# Patient Record
Sex: FEMALE
[De-identification: names, ages, dates, MRNs, and addresses within clinical notes are randomized per-mention and may not be internally consistent; named-entity substitution may affect disease eponyms.]

---

## 2020-07-29 ENCOUNTER — HOSPITAL ENCOUNTER (OUTPATIENT)
Dept: HOSPITAL 92 - BICMAMMO | Age: 75
Discharge: HOME | End: 2020-07-29
Attending: INTERNAL MEDICINE
Payer: MEDICARE

## 2020-07-29 DIAGNOSIS — M85.852: ICD-10-CM

## 2020-07-29 DIAGNOSIS — M81.0: Primary | ICD-10-CM

## 2020-07-29 PROCEDURE — 77080 DXA BONE DENSITY AXIAL: CPT

## 2020-07-29 NOTE — BD
EXAM: DEXA bone density examination



HISTORY: Osteoporosis



COMPARISON: None



FINDINGS:

L1--bone mineral density 0.970 g/sq cm; T score -0.2. Z score 2.1

L2--bone mineral density 1.109 g/sq cm; T score 0.7; Z score 3.2

L3--bone mineral density 1.035 g/sq cm; T score -0.4; Z score 2.2

L4--bone mineral density 1.156 g/sq cm; T score 0.9, Z score 3.6

Total L1-L4--bone mineral density 1.074 g/sq cm; T score 0.2, Z score 2.8



Left femoral neck--bone mineral density0.617; T score -2.1, Z score 0.1

Total proximal left femur--bone mineral density 0.844; T score -0.8, Z score 1.1



This patient has a 10 year WHO fracture risk of a major osteoporotic fracture of 8.6% and of a hip fr
acture of 2.3%.



IMPRESSION: Based on the WHO criteria, the patient's bone mineral density is consideredOsteopenic.  T
he patient is at moderate risk for fracture.



Reported By: James Stockton 

Electronically Signed:  7/29/2020 9:56 AM